# Patient Record
Sex: FEMALE | Race: BLACK OR AFRICAN AMERICAN | Employment: FULL TIME | URBAN - METROPOLITAN AREA
[De-identification: names, ages, dates, MRNs, and addresses within clinical notes are randomized per-mention and may not be internally consistent; named-entity substitution may affect disease eponyms.]

---

## 2019-05-02 ENCOUNTER — HOSPITAL ENCOUNTER (OUTPATIENT)
Dept: PHYSICAL THERAPY | Age: 44
Discharge: HOME | End: 2019-05-02
Payer: COMMERCIAL

## 2019-05-02 PROCEDURE — 97140 MANUAL THERAPY 1/> REGIONS: CPT

## 2019-05-02 PROCEDURE — 97162 PT EVAL MOD COMPLEX 30 MIN: CPT

## 2019-05-02 PROCEDURE — 97110 THERAPEUTIC EXERCISES: CPT

## 2019-05-02 NOTE — PROGRESS NOTES
[id number] [facility] [address] [facility] AT [address] [place] [address] Phone: [phone] Fax: [phone] PLAN OF CARE / STATEMENT OF MEDICAL NECESSITY FOR PHYSICAL THERAPY SERVICES Patient Name: [place] : 1975 Medical  
Diagnosis: Chronic right shoulder pain [M25.511, G89.29] Treatment Diagnosis: R UT myalgia Onset Date: 2019 Referral Source: Bashir Smith MD Start of Care [facility]): 2019 Prior Hospitalization: See medical history Provider #: [id number] Prior Level of Function: Functional I Comorbidities: obesity Medications: Verified on Patient Summary List  
The Plan of Care and following information is based on the information from the initial evaluation.  
======================================================================== Assessment / key information:  Pt is a 37y.o. year old female who presents with co R shoulder and B neck pain starting 2019 insidious with progressive worsening over time. PMH significant for CS pain approx 1 year ago with insidious resolve over time. Pain management via Ibuprofen and rest.   Xrays negative for shoulder. Patient is R hand dominant. PMH significant for B knee scope with PT. Current deficits include: increased pain to 10/10 at worst, Posture: fwd shoulder, large chested ROM:  L WFL  
            R end range pain throughout CS rotation - L 65, R 57 deg sec to UT pain Strength:  R - grossly 4+/5 sec to pain                              
 Scapulohumoral Control / Rhythm: 
Able to eccentrically lower with good control? Left: [x] Yes   [] No     Right: [x] Yes   [] No 
Palpation [] Min  [x] Mod  [] Severe    Location: Taut bands with tenderness of R UT, LS, CS paraspinal and medial scapula    Functional deficits include: UE overhead reaching and lifting , carrying purse, aerobic class, sleep intolerance on R side - will wake patient up, work duties - typing/ computer use/ prolonged sitting. Home exercise program initiated on initial evaluation to address these deficits. Pt would benefit from PT to address these deficits for increased functional mobility and QOL. 
======================================================================== Eval Complexity: History: MEDIUM  Complexity : 1-2 comorbidities / personal factors will impact the outcome/ POC Exam:HIGH Complexity : 4+ Standardized tests and measures addressing body structure, function, activity limitation and / or participation in recreation  Presentation: MEDIUM Complexity : Evolving with changing characteristics  Clinical Decision Making:MEDIUM Complexity : FOTO score of 26-74Overall Complexity:MEDIUM Problem List: pain affecting function, decrease ROM, decrease strength, decrease ADL/ functional abilitiies, decrease activity tolerance, decrease flexibility/ joint mobility and other FOTO 49/100 Treatment Plan may include any combination of the following: Therapeutic exercise, Therapeutic activities, Neuromuscular re-education, Physical agent/modality, Manual therapy, Aquatic therapy, Patient education, Self Care training and Functional mobility training Patient / Family readiness to learn indicated by: asking questions, trying to perform skills and interestPersons(s) to be included in education: patient (P) Barriers to Learning/Limitations: None Measures taken:   
Patient Goal (s):\"some relief from the pain and stiffnes\" Patient self reported health status: good Rehabilitation Potential: good ? Short Term Goals: To be accomplished in  1  weeks: 1. Pt will be independent and compliant with HEP 
? Long Term Goals: To be accomplished in  8-12  treatments: 1. Patient will increase FOTO score to 69/100 for indications of increased functional mobility. 2.  Patient will demo pain free shoulder AROM for ease with [facility] reaching/ lifting with ADLs 3. Patient will demo 5/5 shoulder strength for improved postural stability with work duties 4. Patient will demo R CS rotation to 65 deg (B symm) for ease checking blind spot while driving Frequency / Duration:   Patient to be seen  1-2  times per week for 5-12  treatments: 
Patient / Caregiver education and instruction: self care, activity modification and exercises Therapist Signature: Armond Cantu PT Date: 5/2/2019 Certification Period: NA Time: 509pm  
======================================================================== I certify that the above Physical Therapy Services are being furnished while the patient is under my care. I agree with the treatment plan and certify that this therapy is necessary. Physician Signature:       Date:      Time: 
Please sign and return to [facility] or you may fax the signed copy to [phone]. Thank you.

## 2019-05-02 NOTE — PROGRESS NOTES
Patient Name: Jerry Suarez Date:2019 : 1975 [x]  Patient  Verified Payor: BLUE CROSS / Plan: [place] [employer] [place] [employer] / Product Type: PPO / In time:415  Out time:510 Total Treatment Time (min): 55 Total Timed Codes (min): 25 
1:1 Treatment Time ( only): 45 Visit #: 1 of  Treatment Area: Chronic right shoulder pain [M25.511, G89.29] SUBJECTIVE Pain Level (0-10 scale): (C):4  (B):4  (W):  10 Any medication changes, allergies to medications, diagnosis change, or new procedure performed: see summary sheet for update Subjective functional status/changes: CHIEF COMPLAINT: Patient reports with co R shoulder and B neck pain starting 2019 insidious with progressive worsening over time. PMH significant for CS pain approx 1 year ago with insidious resolve over time. Pain management via Ibuprofen and rest.   Xrays negative for shoulder. Patient is R hand dominant. PMH significant for B knee scope with PT.  
DEFICITS: UE overhead reaching and lifting , carrying purse, aerobic class, sleep intolerance on R side - will wake patient up, work duties - typing/ computer use/ prolonged sitting PT SHOULDER EVAL AND TREATMENT Treatment Area: Chronic right shoulder pain [M25.511, G89.29] Posture: fwd shoulder, large chested ROM:  L WFL  
 R end range pain throughout CS rotation - L 65, R 57 deg sec to UT pain Strength:  R - grossly 4+/5 sec to pain Scapulohumoral Control / Rhythm: 
Able to eccentrically lower with good control? Left: [x] Yes   [] No     Right: [x] Yes   [] No 
 
Palpation [] Min  [x] Mod  [] Severe    Location: Taut bands with tenderness of R UT, LS, CS paraspinal and medial scapula Other Tests / Comments:  
 
Patient Education/ Therapeutic Exercise : [x] Discussed POT including PT expectation, established HEP with pictures and instruction, per FS   (minutes) : 15 
 
 Manual: 10 min : DTM R UT, LS and medial scap , manual UT stretch Modality (rationale): decrease post treatment m soreness and m tightness  
[x]  MHP 10 min B CS/ shoulder Pain Level (0-10 scale) post treatment: 2 PLAN[x]  Upgrade activities as tolerated    
[x] Other:_ POC  1-2 x 5 - 12 Michael Man, PT 5/2/2019 Justification for Eval Code Complexity: 
Patient History : chronicity, obesity Examination see exam  
Clinical Presentation: evolving sec to progress spread of pain to B CS Clinical Decision Making : FOTO : 49 /100

## 2019-05-07 ENCOUNTER — HOSPITAL ENCOUNTER (OUTPATIENT)
Dept: PHYSICAL THERAPY | Age: 44
Discharge: HOME | End: 2019-05-07
Payer: COMMERCIAL

## 2019-05-07 PROCEDURE — 97110 THERAPEUTIC EXERCISES: CPT

## 2019-05-07 PROCEDURE — 97140 MANUAL THERAPY 1/> REGIONS: CPT

## 2019-05-07 NOTE — PROGRESS NOTES
PT DAILY TREATMENT NOTE  Patient Name: Joel Muse Date:2019 : 1975 [x]  Patient  Verified Payor: BLUE CROSS / Plan: [facility] [employer] / Product Type: PPO / In time: 7:00 am       Out time: 8:08 am 
Total Treatment Time (min): 68 Total Timed Codes (min): 58 
1:1 Treatment Time (min): 58 Visit #: 2 of  Treatment Area: Chronic right shoulder pain [M25.511, G89.29] SUBJECTIVE Pain Level (0-10 scale): 1 Any medication changes, allergies to medications, adverse drug reactions, diagnosis change, or new procedure performed?: [x] No    [] Yes (see summary sheet for update) Subjective functional status/changes:   [] No changes reported \"I am doing good with the exercises. \" OBJECTIVEModality rationale: decrease pain and increase tissue extensibility to improve the patients ability to perform ADLs Min Type Additional Details  
 [] Estim: []Att   []Unatt []IFC  []Premod                                            []NMES    []Other:  []w/ice   []w/heat Position: Location:  
 []  Traction: [] Cervical       [] Lumbar 
                     [] Supine        [] Prone []Intermittent   []Continuous Lbs: 
[] before manual 
[] after manual  
 []  Ultrasound: []Continuous   [] Pulsed []1MHz   []3MHz Location: 
W/cm2:  
 []  Iontophoresis with dexamethasone Location: [] Take home patch  
[] In clinic  
10 []  Ice     [x]  heat 
[]  Ice massage Position: supine with LEs elevated on wedge Location: (R) UT  
 []  Vasopneumatic Device Pressure: [] lo [] med [] hi  
Temp: [] lo [] med [] hi  
[x] Skin assessment post-treatment:  [x]intact    []redness- no adverse reaction 
                                                                               []redness  adverse reaction:  
 
48 min Therapeutic Exercise:  [x] See flow sheet: initiated therex per IE  
 Rationale: increase ROM, increase strength and improve coordination to improve the patients ability to perform work duties 10 min Manual Therapy:  STM/DTM to (R) UT in supine Rationale: decrease pain, increase ROM, increase tissue extensibility and decrease trigger points to improve the patients ability to perform ADLs 
       
with TE min Patient Education: [x] Review HEP from IE - added doorway stretch (high or low) and open books Other Objective/Functional Measures:  
Limited (R) T/S rotation noted with open books. Pain Level (0-10 scale) post treatment: 0 
 
ASSESSMENT/Changes in Function:  
Good tolerance to treatment today with patient req 100% verbal/tactile cueing and demo for proper form/technique with all newly introduced therex. Discussed seated work station modifications to reduce posterior chain stiffness. Patient will continue to benefit from skilled PT services to modify and progress therapeutic interventions, address functional mobility deficits, address ROM deficits, address strength deficits, analyze and address soft tissue restrictions, analyze and cue movement patterns, analyze and modify body mechanics/ergonomics, assess and modify postural abnormalities and address imbalance/dizziness to attain remaining goals. [x]  See Plan of Care 
[]  See progress note/recertification 
[]  See Discharge Summary Progress towards goals / Updated goals: · Short Term Goals: To be accomplished in  1  weeks: 1. Pt will be independent and compliant with HEP. -Goal met; pt notes compliance as indicated by ease with HEP therex (5/7/19) · Long Term Goals: To be accomplished in  8-12  treatments: 1. Patient will increase FOTO score to 69/100 for indications of increased functional mobility. 2.  Patient will demo pain free shoulder AROM for ease with [facility] reaching/ lifting with ADLs 3. Patient will demo 5/5 shoulder strength for improved postural stability with work duties 4.  Patient will demo R CS rotation to 65 deg (B symm) for ease checking blind spot while driving PLAN [x]  Upgrade activities as tolerated     [x]  Continue plan of care 
[]  Update interventions per flow sheet      
[]  Discharge due to:_ 
[x]  Other: assess response to treatment Juan Diego Benitez PTA 5/7/2019

## 2019-05-10 ENCOUNTER — HOSPITAL ENCOUNTER (OUTPATIENT)
Dept: PHYSICAL THERAPY | Age: 44
Discharge: HOME | End: 2019-05-10
Payer: COMMERCIAL

## 2019-05-10 PROCEDURE — 97014 ELECTRIC STIMULATION THERAPY: CPT

## 2019-05-10 PROCEDURE — 97140 MANUAL THERAPY 1/> REGIONS: CPT

## 2019-05-10 PROCEDURE — 97110 THERAPEUTIC EXERCISES: CPT

## 2019-05-10 NOTE — PROGRESS NOTES
PT DAILY TREATMENT NOTE  Patient Name: Yuki Call Date:5/10/2019 : 1975 [x]  Patient  Verified Payor: BLUE CROSS / Plan: [place] [facility] [place] [employer] / Product Type: PPO / In time: 7:35 am       Out time: 8:36 am 
Total Treatment Time (min): 61 Total Timed Codes (min): 51 
1:1 Treatment Time (min): 7:40am-8:26am (46) Visit #: 3 of  Treatment Area: Chronic right shoulder pain [M25.511, G89.29] SUBJECTIVE Pain Level (0-10 scale): 5.5 Any medication changes, allergies to medications, adverse drug reactions, diagnosis change, or new procedure performed?: [x] No    [] Yes (see summary sheet for update) Subjective functional status/changes:   [] No changes reported \"I think I am hurting from doing yardwork. \" OBJECTIVEModality rationale: decrease pain and increase tissue extensibility to improve the patients ability to perform ADLs Min Type Additional Details 10 [x] Estim: []Att   [x]Unatt []IFC  []Premod                                            []NMES    [x]HVES []w/ice   [x]w/heat Position: seated Location: 2 channels; superior and lower fibers of UT  
 []  Traction: [] Cervical       [] Lumbar 
                     [] Supine        [] Prone []Intermittent   []Continuous Lbs: 
[] before manual 
[] after manual  
 []  Ultrasound: []Continuous   [] Pulsed []1MHz   []3MHz Location: 
W/cm2:  
 []  Iontophoresis with dexamethasone Location: [] Take home patch  
[] In clinic  
 []  Ice     []  heat 
[]  Ice massage Position: Location:   
 []  Vasopneumatic Device Pressure: [] lo [] med [] hi  
Temp: [] lo [] med [] hi  
[x] Skin assessment post-treatment:  [x]intact    []redness- no adverse reaction 
                                                                               []redness  adverse reaction:  
 
41 min Therapeutic Exercise:  [x] See flow sheet: Rationale: increase ROM, increase strength and improve coordination to improve the patients ability to perform work duties 10 min Manual Therapy:  prone PA mobs to T/S; STM/DTM to (R) UT in supine f/b grade III ACJ mobs for posterior rotation, gentle C/S traction Rationale: decrease pain, increase ROM, increase tissue extensibility and decrease trigger points to improve the patients ability to perform ADLs 
       
with TE min Patient Education: [x] Review HEP Other Objective/Functional Measures:  
 
Pain Level (0-10 scale) post treatment: 2 
 
ASSESSMENT/Changes in Function:  
Patient notes first f/u treatment did not cause any adverse reaction, but notes inc pain after doing yardwork the other day. (+) for TTP at the right UT superior fibers > inferior fibers, address with MT. Poor SHR leading to UT hiking with shoulder elevation attempts. Progressing well with increasing T/S mobility. Patient will continue to benefit from skilled PT services to modify and progress therapeutic interventions, address functional mobility deficits, address ROM deficits, address strength deficits, analyze and address soft tissue restrictions, analyze and cue movement patterns, analyze and modify body mechanics/ergonomics, assess and modify postural abnormalities and address imbalance/dizziness to attain remaining goals. [x]  See Plan of Care 
[]  See progress note/recertification 
[]  See Discharge Summary Progress towards goals / Updated goals: · Short Term Goals: To be accomplished in  1  weeks: 1. Pt will be independent and compliant with HEP. -Goal met; pt notes compliance as indicated by ease with HEP therex (5/7/19) · Long Term Goals: To be accomplished in  8-12  treatments: 1. Patient will increase FOTO score to 69/100 for indications of increased functional mobility. 2.  Patient will demo pain free shoulder AROM for ease with [facility] reaching/ lifting with ADLs 3.  Patient will demo 5/5 shoulder strength for improved postural stability with work duties 4. Patient will demo R CS rotation to 65 deg (B symm) for ease checking blind spot while driving PLAN [x]  Upgrade activities as tolerated     [x]  Continue plan of care 
[]  Update interventions per flow sheet      
[]  Discharge due to:_ 
[]  Other:_ 
 
Julio Drake, PTA 5/10/2019

## 2019-05-15 ENCOUNTER — HOSPITAL ENCOUNTER (OUTPATIENT)
Dept: PHYSICAL THERAPY | Age: 44
Discharge: HOME | End: 2019-05-15
Payer: COMMERCIAL

## 2019-05-15 PROCEDURE — 97140 MANUAL THERAPY 1/> REGIONS: CPT

## 2019-05-15 PROCEDURE — 97014 ELECTRIC STIMULATION THERAPY: CPT

## 2019-05-15 PROCEDURE — 97110 THERAPEUTIC EXERCISES: CPT

## 2019-05-16 NOTE — PROGRESS NOTES
PT DAILY TREATMENT NOTE     Patient Name: Sia Garcia  Date:5/15/2019  : 1975  [x]  Patient  Verified  Payor: BLUE CROSS / Plan: [employer] [facility] / Product Type: PPO /    In time: 5:05 pm       Out time: 6:09 pm  Total Treatment Time (min): 64  otal Timed Codes (min): 54  1:1 Treatment Time (min): 25  Visit #: 4 of     Treatment Area: Chronic right shoulder pain [M25.511, G89.29]    SUBJECTIVE  Pain Level (0-10 scale): 3  Any medication changes, allergies to medications, adverse drug reactions, diagnosis change, or new procedure performed?: [x] No    [] Yes (see summary sheet for update)  Subjective functional status/changes:   [] No changes reported  \"I felt so good after last time. I thought I was healed. I went back to the mix-fit class and I think that's what did it. \"    OBJECTIVE  Modality rationale: decrease pain and increase tissue extensibility to improve the patients ability to perform ADLs     Min Type Additional Details   10 [x] Estim: []Att   [x]Unatt                  []IFC  []Premod                                            []NMES    [x]HVES []w/ice   [x]w/heat  Position: seated  Location: (R) UT    []  Traction: [] Cervical       [] Lumbar                       [] Supine        [] Prone                       []Intermittent   []Continuous Lbs:  [] before manual  [] after manual    []  Ultrasound: []Continuous   [] Pulsed                           []1MHz   []3MHz Location:  W/cm2:    []  Iontophoresis with dexamethasone         Location: [] Take home patch   [] In clinic    []  Ice     []  heat  []  Ice massage Position:   Location:     []  Vasopneumatic Device Pressure: [] lo [] med [] hi   Temp: [] lo [] med [] hi   [x] Skin assessment post-treatment:  [x]intact    []redness- no adverse reaction                                                                                 []redness  adverse reaction:     38 min Therapeutic Exercise:  [x] See flow sheet: added prone series and chin tucks   Rationale: increase ROM, increase strength and improve coordination to improve the patients ability to perform work duties     16 min Manual Therapy:  prone PA mobs to T/S; STM/DTM to (R) UT   Rationale: decrease pain, increase ROM, increase tissue extensibility and decrease trigger points to improve the patients ability to perform ADLs          with TE min Patient Education: [x] Review HEP - add prone letters and chin tucks     Other Objective/Functional Measures:     Pain Level (0-10 scale) post treatment: 1    ASSESSMENT/Changes in Function:   Patient req min VCs to reduce UT overactivation with all shoulder elevation attempts. Min pain-free cavitation with PA mobs to the T6-7 with improved shoulder mobility thereafter. Last scheduled appt NV and will assess for continuance versus D/C. Patient will continue to benefit from skilled PT services to modify and progress therapeutic interventions, address functional mobility deficits, address ROM deficits, address strength deficits, analyze and address soft tissue restrictions, analyze and cue movement patterns, analyze and modify body mechanics/ergonomics, assess and modify postural abnormalities and address imbalance/dizziness to attain remaining goals. [x]  See Plan of Care  []  See progress note/recertification  []  See Discharge Summary         Progress towards goals / Updated goals: · Short Term Goals: To be accomplished in  1  weeks:  1. Pt will be independent and compliant with HEP. -Goal met; pt notes compliance as indicated by ease with HEP therex (5/7/19)  · Long Term Goals: To be accomplished in  8-12  treatments:  1. Patient will increase FOTO score to 69/100 for indications of increased functional mobility. 2.  Patient will demo pain free shoulder AROM for ease with [place] reaching/ lifting with ADLs   3. Patient will demo 5/5 shoulder strength for improved postural stability with work duties   4.   Patient will demo R CS rotation to 65 deg (B symm) for ease checking blind spot while driving     PLAN  [x]  Upgrade activities as tolerated     [x]  Continue plan of care  []  Update interventions per flow sheet       []  Discharge due to:_  []  Other:_    Isi Dale, PTA 5/15/2019

## 2019-05-23 ENCOUNTER — HOSPITAL ENCOUNTER (OUTPATIENT)
Dept: PHYSICAL THERAPY | Age: 44
Discharge: HOME | End: 2019-05-23
Payer: COMMERCIAL

## 2019-05-23 PROCEDURE — 97110 THERAPEUTIC EXERCISES: CPT

## 2019-05-23 PROCEDURE — 97014 ELECTRIC STIMULATION THERAPY: CPT

## 2019-05-23 PROCEDURE — 97140 MANUAL THERAPY 1/> REGIONS: CPT

## 2019-05-23 NOTE — PROGRESS NOTES
PT DAILY TREATMENT NOTE     Patient Name: Raz Andre  Date:2019  : 1975  [x]  Patient  Verified  Payor: BLUE CROSS / Plan: [employer] [facility] / Product Type: PPO /    In time:5:05  Out time:6:12  Total Treatment Time (min): 67  Total Timed Codes (min): 57  1:1 Treatment Time (min): 5:05 to 5:35 (30)   Visit #: 5 of     Treatment Area: Chronic right shoulder pain [M25.511, G89.29]    SUBJECTIVE  Pain Level (0-10 scale): 1  Any medication changes, allergies to medications, adverse drug reactions, diagnosis change, or new procedure performed?: [x] No    [] Yes (see summary sheet for update)  Subjective functional status/changes:   [] No changes reported  Pain mostly in the R shoulder and the bottom of the (L) neck. Neck movement: hear some popping, It's OK, a little stiff  Shoulder movement: OK, just the R (points to ACJ) with pain. Workout classes: bothered me last time, so I have held off of it lately.    50% improvement  Improvements; less pain with holding purse, less mm pain in R UT, improved work ergonomics, no pain with typing, sitting several hours without pain, less tightness with OH reaching   Deficits: night pain,   Pain ranges: 0 to 5/10     OBJECTIVE  Modality rationale: decrease pain and increase tissue extensibility to improve the patients ability to sit, reach, ADLs   Min Type Additional Details   10 [x] Estim: []Att   [x]Unatt        []TENS instruct                  []IFC  []Premod   []NMES                     [x]Other:  HVES []w/US   []w/ice   [x]w/heat  Position: seated  Location: 2 channels, superior and inf fibers of the UT   [x] Skin assessment post-treatment:  [x]intact []redness- no adverse reaction       []redness  adverse reaction:       45 (18) min Therapeutic Exercise:  [x] See flow sheet : increased prone series    Rationale: increase ROM, increase strength and improve coordination to improve the patients ability to reach [facility], ADLs, work     12 min Manual Therapy:  prone PA mobs to T/S; STM/DTM to (R) UT, L C/S paraspinals   Rationale: decrease pain, increase ROM, increase tissue extensibility and decrease trigger points to improve reaching, Work          x min Patient Education: [x] Review HEP    [] Progressed/Changed HEP based on:   [] positioning   [] body mechanics   [] transfers   [] heat/ice application        Other Objective/Functional Measures:       Pain Level (0-10 scale) post treatment: 0    ASSESSMENT/Changes in Function: progressing well with mobility tolerance and gentle return to sport. Pt with min TTP and taught bandds in the (L) C/S paraspinals. R UT TrP refers pain to the occiput, resolved with MT. Patient will continue to benefit from skilled PT services to modify and progress therapeutic interventions, address functional mobility deficits, address ROM deficits, address strength deficits, analyze and address soft tissue restrictions, analyze and cue movement patterns, analyze and modify body mechanics/ergonomics, assess and modify postural abnormalities and instruct in home and community integration to attain remaining goals. []  See Plan of Care  []  See progress note/recertification  []  See Discharge Summary         Progress towards goals / Updated goals: ·  Short Term Goals: To be accomplished in  1  weeks:  1.  Pt will be independent and compliant with HEP.  -Goal met; pt notes compliance with HEP and working out (5/23/19)  · [address] be accomplished in  8-12  treatments:  1.  Patient will increase FOTO score to 69/100 for indications of increased functional mobility.   Will assess NV (5/23/19)  2.  Patient will demo pain free shoulder AROM for ease with  reaching/ lifting with ADLs    3.  Patient will demo 5/5 shoulder strength for improved postural stability with work duties Progressing well with increased TE (5/23/19)  4.  Patient will demo R CS rotation to 65 deg (B symm) for ease checking blind spot while driving     PLAN  [x]  Upgrade activities as tolerated     []  Continue plan of care  []  Update interventions per flow sheet       []  Discharge due to:_  [x]  Other:_  Reassessment for possible D/C LAUREN Faulkner, PT 5/23/2019  1:15 PM

## 2019-05-31 ENCOUNTER — HOSPITAL ENCOUNTER (OUTPATIENT)
Dept: PHYSICAL THERAPY | Age: 44
Discharge: HOME | End: 2019-05-31
Payer: COMMERCIAL

## 2019-05-31 PROCEDURE — 97110 THERAPEUTIC EXERCISES: CPT

## 2019-05-31 PROCEDURE — 97014 ELECTRIC STIMULATION THERAPY: CPT

## 2019-05-31 PROCEDURE — 97140 MANUAL THERAPY 1/> REGIONS: CPT

## 2019-05-31 NOTE — PROGRESS NOTES
PT DAILY TREATMENT NOTE     Patient Name: Yara Rosado  Date:2019  : 1975  [x]  Patient  Verified  Payor: [employer] / Plan: [address] [place] [address] [employer] / Product Type: PPO /    In time 7:10   Out time:8:10  Total Treatment Time (min): 60  Total Timed Codes (min): 50  1:1 Treatment Time (min): 50   Visit #: 6 of     Treatment Area: Chronic right shoulder pain [M25.511, G89.29]    SUBJECTIVE  Pain Level (0-10 scale): 3  Any medication changes, allergies to medications, adverse drug reactions, diagnosis change, or new procedure performed?: [x] No    [] Yes (see summary sheet for update)  Subjective functional status/changes:   [] No changes reported  Pt was 10 minutes late to treatment     Pt notes poor tolerance to PA glides     OBJECTIVE  Modality rationale: decrease pain and increase tissue extensibility to improve the patients ability to reach [facility], workout   Min Type Additional Details   10 [x] Estim: []Att   [x]Unatt        []TENS instruct                  []IFC  []Premod   []NMES                     [x]Other:  HVES []w/US   []w/ice   [x]w/heat  Position: seated  Location: 2 channels, superior and inferior fibers of the UT    [x] Skin assessment post-treatment:  [x]intact []redness- no adverse reaction       []redness  adverse reaction:       35 min Therapeutic Exercise:  [x] See flow sheet : increased prone series , performed FOTO and reassessment with pt    Rationale: increase ROM, increase strength and improve coordination to improve the patients ability to reach [facility], ADLs, work      15 min Manual Therapy:   STM/DTM to (R) UT, (B) C/S paraspinals, R posterior cuff, IASTM to R UT    Rationale: decrease pain, increase ROM, increase tissue extensibility and decrease trigger points to improve reaching, Work           x min Patient Education: [x] Review HEP    [x] Progressed/Changed HEP based on: final D/C plan. Pt moving to [place]. Limited in time and distance at this time.      [] positioning [] body mechanics   [] transfers   [] heat/ice application        Other Objective/Functional Measures:    pain ranges: 0-10/10, ave 4/10   Subjective improvements: less pain in the UT and mid scapular region , OH reaching, less pain with carrying purse, unlimited working tolerance for 5-6 hours    Deficits: CC  pain in the ACJ with certain movements (luis crossover), painful sleeping on R side, waking 3-4 times due to pain, aerobics classes at the gym   50% improvement  FOTO 55/100  CS AROM:  Flex 45, ext 55, (B) SB 48; L rot 70, R Rot 70  T/S AROM: 50% with pain to the L (UT pain)  GHJ AROM: R flex 140, ext 55, abd 150, ER at 0: 50 , FIR T7 (B)  GHJ MMT: 5/5 all planes  LT, MT 4/5 (B)   + Crossover, - Neer, - HK, - Speeds     Pain Level (0-10 scale) post treatment: 0    ASSESSMENT/Changes in Function: see D/C note    Patient will continue to benefit from skilled PT services to modify and progress therapeutic interventions, address functional mobility deficits, address ROM deficits, address strength deficits, analyze and address soft tissue restrictions, analyze and cue movement patterns, analyze and modify body mechanics/ergonomics, assess and modify postural abnormalities and instruct in home and community integration to attain remaining goals. []  See Plan of Care  []  See progress note/recertification  [x]  See Discharge Summary         Progress towards goals / Updated goals:  ·  Short Term Goals: To be accomplished in  1  weeks:  1.  Pt will be independent and compliant with HEP.  -Goal met; pt notes compliance with HEP and working out (5/23/19)  · [address] be accomplished in  8-12  treatments:  1.  Patient will increase FOTO score to 69/100 for indications of increased functional mobility.   Goal progressing at 55/100 (5/31/19)  2.  Patient will demo pain free shoulder AROM for ease with [facility] reaching/ lifting with ADLs  Goal progress with functional ranges with limited end range strength, painful H adduction  (5/31/19)  3.  Patient will demo 5/5 shoulder strength for improved postural stability with work duties Goal progressing with 5/5 GHJ, 4/5 scapular (5/31/19)  4.  Patient will demo R CS rotation to 65 deg (B symm) for ease checking blind spot while driving  Goal met at 70 deg (B) rotation, pain free (5/31/19)    PLAN  []  Upgrade activities as tolerated     []  Continue plan of care  []  Update interventions per flow sheet       [x]  Discharge due to:_pt moving to D/C. Made functional gains towards goals and I with HEP progression.    []  Other:       Jasbir Perez, PT 5/31/2019  6:37 AM

## 2019-05-31 NOTE — PROGRESS NOTES
[address] [facility] AT [address] [place], [place] [address]  Phone: [phone] Fax: [phone]  DISCHARGE NOTE  Patient Name: Dylan Whitley : 1975   Treatment/Medical Diagnosis: Chronic right shoulder pain [M25.511, G89.29]   Referral Source: Allyson Zelaya MD     Date of Initial Visit: 19 Attended Visits: 6 Missed Visits: 0     SUMMARY OF TREATMENT  Pt is a 37y.o. year old female who presents with co R shoulder and B neck pain starting 2019 insidious with progressive worsening over time. Ther ex including strengthening, ROM, flexibility, stabilization; manual therapy including: DTM, TrP release, PROM; Patient education; HEP, estim with  for pain control   CURRENT STATUS  Pt is making good progress in therapy. Pain ranges from 0-10/10, ave 4/10, and pt rates 50% improvement. Score on the FOTO has improved from 49/100 at IE to 55/100 noting some functional improvements. Subjective improvements: less pain in the UT and mid scapular region , OH reaching, less pain with carrying purse, unlimited working tolerance for 5-6 hours    Deficits: CC  pain in the ACJ with certain movements (luis crossover), painful sleeping on R side, waking 3-4 times due to pain, aerobics classes at the gym     Objective:   CS AROM:  Flex 45, ext 55, (B) SB 48; L rot 70, R Rot 70  T/S AROM: 50% with pain to the L (UT pain)  GHJ AROM: R flex 140, ext 55, abd 150, ER at 0: 50 , FIR T7 (B)  GHJ MMT: 5/5 all planes  + Crossover, - Neer, - HK, - Speeds   TTP: posterior cuff (R), R UT, (B) CS paraspinals, suboccipitals    Goals for this period include:  ·  Short Term Goals: To be accomplished in  1  weeks:  1.  Pt will be independent and compliant with HEP.  -Goal met; pt notes compliance with HEP and working out (19)  · [address] be accomplished in  8-12  treatments:  1.  Patient will increase FOTO score to 69/100 for indications of increased functional mobility.   Goal progressing at 55/100 (5/31/19)  2.  Patient will demo pain free shoulder AROM for ease with [facility] reaching/ lifting with ADLs  Goal progress with functional ranges with limited end range strength, painful H adduction  (5/31/19)  3.  Patient will demo 5/5 shoulder strength for improved postural stability with work duties Goal progressing with 5/5 GHJ, 4/5 scapular (5/31/19)  4.  Patient will demo R CS rotation to 65 deg (B symm) for ease checking blind spot while driving  Goal met at 70 deg (B) rotation, pain free (5/31/19)      RECOMMENDATIONS  DC due to scheduling issues and moving to [place]. I with [facility]   If you have any questions/comments please contact us directly at [phone]. Thank you for allowing us to assist in the care of your patient. Therapist Signature: Rai Gayle DPT Date: 5/31/2019     Time: 6:39 AM   NOTE TO PHYSICIAN:  PLEASE COMPLETE THE ORDERS BELOW AND FAX TO   [facility]: [phone]. If you are unable to process this request in 24 hours please contact our office: [phone].  ___ I have read the above report and request that my patient continue as recommended.   ___ I have read the above report and request that my patient continue therapy with the following changes/special instructions:_________________________________________________________   ___ I have read the above report and request that my patient be discharged from therapy.      Physician Signature:        Date:       Time: